# Patient Record
Sex: FEMALE | Race: OTHER
[De-identification: names, ages, dates, MRNs, and addresses within clinical notes are randomized per-mention and may not be internally consistent; named-entity substitution may affect disease eponyms.]

---

## 2020-12-01 ENCOUNTER — HOSPITAL ENCOUNTER (EMERGENCY)
Dept: HOSPITAL 4 - SED | Age: 30
Discharge: HOME | End: 2020-12-01
Payer: COMMERCIAL

## 2020-12-01 VITALS — HEIGHT: 60 IN | WEIGHT: 129 LBS | BODY MASS INDEX: 25.32 KG/M2 | SYSTOLIC BLOOD PRESSURE: 134 MMHG

## 2020-12-01 VITALS — SYSTOLIC BLOOD PRESSURE: 127 MMHG
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
## 2020-12-01 DIAGNOSIS — S61.452A: Primary | ICD-10-CM

## 2020-12-01 NOTE — NUR
Patient given written and verbal discharge instructions and verbalizes 
understanding.  ER MD discussed with patient the results and treatment 
provided. Patient in stable condition. ID arm band removed.

Rx of AUGMENTIN given. Patient educated on pain management and to follow up 
with PMD. Pain Scale 0/10.

Opportunity for questions provided and answered. Medication side effect fact 
sheet provided.

## 2020-12-01 NOTE — NUR
SMALL PUNCTURE WOUND CLEANED WITH NORMAL SALINE AND BACITRACIN APPLIED. 
DRESSING PLACED, PT TOLERATED IT WELL. INSTRUCTED PT ON HOW TO CLEAN WOUND AND 
GIVEN SUPPLIES.

## 2020-12-01 NOTE — NUR
PT STATES HER NEW PUPPY WAS TRYING TO GET THE LEASH WHEN ACCIDENTALLY BIT HER 
IN THE LEFT HAND. BLEEDING CONTROLLED.

## 2020-12-24 ENCOUNTER — HOSPITAL ENCOUNTER (EMERGENCY)
Dept: HOSPITAL 4 - SED | Age: 30
Discharge: HOME | End: 2020-12-24
Payer: COMMERCIAL

## 2020-12-24 VITALS — BODY MASS INDEX: 25.52 KG/M2 | WEIGHT: 130 LBS | HEIGHT: 60 IN

## 2020-12-24 VITALS — SYSTOLIC BLOOD PRESSURE: 120 MMHG

## 2020-12-24 VITALS — SYSTOLIC BLOOD PRESSURE: 118 MMHG

## 2020-12-24 DIAGNOSIS — N93.9: Primary | ICD-10-CM

## 2020-12-24 LAB
BASOPHILS # BLD AUTO: 0 K/UL (ref 0–0.2)
BASOPHILS NFR BLD AUTO: 0.3 % (ref 0–2)
EOSINOPHIL # BLD AUTO: 0 K/UL (ref 0–0.4)
EOSINOPHIL NFR BLD AUTO: 0.1 % (ref 0–4)
ERYTHROCYTE [DISTWIDTH] IN BLOOD BY AUTOMATED COUNT: 12.7 % (ref 9–15)
HCT VFR BLD AUTO: 40.8 % (ref 36–48)
HGB BLD-MCNC: 13.8 G/DL (ref 12–16)
INR PPP: 0.9 (ref 0.8–1.2)
LYMPHOCYTES # BLD AUTO: 1.1 K/UL (ref 1–5.5)
LYMPHOCYTES NFR BLD AUTO: 21.8 % (ref 20.5–51.5)
MCH RBC QN AUTO: 30 PG (ref 27–31)
MCHC RBC AUTO-ENTMCNC: 34 % (ref 32–36)
MCV RBC AUTO: 90 FL (ref 79–98)
MONOCYTES # BLD MANUAL: 0.2 K/UL (ref 0–1)
MONOCYTES # BLD MANUAL: 4.4 % (ref 1.7–9.3)
NEUTROPHILS # BLD AUTO: 3.8 K/UL (ref 1.8–7.7)
NEUTROPHILS NFR BLD AUTO: 73.4 % (ref 40–70)
PLATELET # BLD AUTO: 300 K/UL (ref 130–430)
PROTHROMBIN TIME: 9.6 SECS (ref 9.5–12.5)
RBC # BLD AUTO: 4.56 MIL/UL (ref 4.2–6.2)
WBC # BLD AUTO: 5.2 K/UL (ref 4.8–10.8)

## 2020-12-24 NOTE — NUR
Patient came from home for evaluation of heavy vaginal bleeding with clots 
since last night.  Patient denies nausea,vomiting,diarrhea and pain.

## 2020-12-24 NOTE — NUR
-------------------------------------------------------------------------------

           *** Note domonique in ED - 12/24/20 at 0801 by JERED ***            

-------------------------------------------------------------------------------

Patient to [facility] for evaluation. Side rails up.